# Patient Record
Sex: FEMALE | Race: BLACK OR AFRICAN AMERICAN | URBAN - METROPOLITAN AREA
[De-identification: names, ages, dates, MRNs, and addresses within clinical notes are randomized per-mention and may not be internally consistent; named-entity substitution may affect disease eponyms.]

---

## 2021-01-27 ENCOUNTER — TRANSFERRED RECORDS (OUTPATIENT)
Dept: PHYSICAL THERAPY | Facility: CLINIC | Age: 51
End: 2021-01-27

## 2021-03-04 ENCOUNTER — THERAPY VISIT (OUTPATIENT)
Dept: PHYSICAL THERAPY | Facility: CLINIC | Age: 51
End: 2021-03-04
Payer: MEDICAID

## 2021-03-04 DIAGNOSIS — M54.50 BILATERAL LOW BACK PAIN WITHOUT SCIATICA: Primary | ICD-10-CM

## 2021-03-04 PROCEDURE — 97161 PT EVAL LOW COMPLEX 20 MIN: CPT | Mod: GP | Performed by: PHYSICAL THERAPIST

## 2021-03-04 PROCEDURE — 97140 MANUAL THERAPY 1/> REGIONS: CPT | Mod: GP | Performed by: PHYSICAL THERAPIST

## 2021-03-04 NOTE — LETTER
DEPARTMENT OF HEALTH AND HUMAN SERVICES  [facility] FOR MEDICARE & MEDICAID SERVICES    PLAN/UPDATED PLAN OF PROGRESS FOR OUTPATIENT REHABILITATION       PATIENTS NAME:  Bouchra Rossi     : 1970    PROVIDER NUMBER:    [id number]    [id number]     PROVIDER NAME: [facility]    MEDICAL RECORD NUMBER: [id number]     START OF CARE DATE:  SOC Date: 21   TYPE:  PT    PRIMARY/TREATMENT DIAGNOSIS: (Pertinent Medical Diagnosis)  Bilateral low back pain without sciatica    VISITS FROM START OF CARE:  Rxs Used: 1     KEY PT FINDINGS:  1) Unexplained weight loss and associated sx - concern  2) Extension preference  3) Weak core evidenced by + Timoteo sign.    Physical Therapy Initial Evaluation: Subjective History     Injury/Condition Details:  Presenting Complaint LBP and L sided pain to anterior thigh   Onset Timing/Date 21 (date of MD order)  Years, worsening the last couple months   Mechanism Gradual      Symptom Behavior Details    Primary Symptoms pain (Location: Low back and L buttock/anterior thigh), currently 8/10   Symptom Provocators Sitting   Symptom Relievers Walking   Time of day dependent? No   Recent symptom change? symptoms worsening     Prior Testing/Intervention for current condition:  Prior Tests  none   Prior Treatment PT (shoulder) - not helpful     Lifestyle & General Medical History:  Employment None   Usual physical activities  (within past year) Playing with grand kids   Orthopaedic history See Epic Chart    Notable medical history See Epic Chart   Patient Reported Health good   Red flags: Patient reports having unexplained weight loss, loss of appetite, night sweats and trouble sleeping with onset same as low back pain.      PHYSICAL THERAPY SPINE EXAMINATION    Dynamic Movement Screen:  2 leg stance: equal weight bearing    Lumbar & Thoracic Spine ROM Screen   RIGHT LEFT   Standing Lumbar Spine ROM   Flexion To ankles, pain increased   Extension Limited, pain       Core: Positive Timoteo's sign observed indicated weak core strength.    Sacroiliac Joint Provocative Testing: pain with sacral compression supine    Tender to palpation at the following structures: Paraspinals bilaterally low back, bilateral PSIS     SUSPECTED DIRECTIONAL PREFERENCE: Extension    Hip PROM:  -Flexion: R WNL; L 70 deg, limited due to pain in low back   -ER: R WNL; L unable to test due to pain  -IR: R WNL; L unable to test due to pain    Lower Extremity Neural System Examination   Right Left   NEURAL TENSION     Supine SLR Test (Sciatic n.) - ++     Lower Extremity Lower Extremity Muscle Strength (x/5) - Deferred due to significant pain during PROM testing.     ASSESSMENT/PLAN:    Patient is a 50 year old female with lumbar complaints.    Patient has the following significant findings with corresponding treatment plan.                Diagnosis 1:  Low back with L sided buttock/anterior thigh pain  Pain -  hot/cold therapy, US, electric stimulation, manual therapy, splint/taping/bracing/orthotics, self management, education and home program  Decreased ROM/flexibility - manual therapy, therapeutic exercise and home program  Decreased joint mobility - manual therapy, therapeutic exercise and home program  Decreased strength - therapeutic exercise, therapeutic activities and home program  Impaired muscle performance - neuro re-education  Decreased function - therapeutic activities  Impaired posture - neuro re-education    Therapy Evaluation Codes:   1) History comprised of:   Personal factors that impact the plan of care:      None.    Comorbidity factors that impact the plan of care are:      None.     Medications impacting care: None.  2) Examination of Body Systems comprised of:   Body structures and functions that impact the plan of care:      Lumbar spine.   Activity limitations that impact the plan of care are:      Bending, Sitting, Sleeping and Laying down.  3) Clinical presentation  "characteristics are:   Evolving/Changing.  4) Decision-Making    Moderate complexity using standardized patient assessment instrument and/or measureable assessment of functional outcome.  Cumulative Therapy Evaluation is: Moderate complexity.    Previous and current functional limitations:  (See Goal Flow Sheet for this information)    Short term and Long term goals: (See Goal Flow Sheet for this information)     Communication ability:  Patient appears to be able to clearly communicate and understand verbal and written communication and follow directions correctly.  Treatment Explanation - The following has been discussed with the patient:   RX ordered/plan of care  Anticipated outcomes  Possible risks and side effects  This patient would benefit from PT intervention to resume normal activities.   Rehab potential is fair.    Frequency:  2-3 X a month, once daily  Duration:  for 3 months  Discharge Plan:  Achieve all LTG.  Independent in home treatment program.  Reach maximal therapeutic benefit.            Caregiver Signature/Credentials _____________________________ Date ________      Treating Provider: Caron Martinez, SERGIO, OCS      I have reviewed and certified the need for these services and plan of treatment while under my care.        PHYSICIAN'S SIGNATURE:   ______________________________________ Date___________                           Ge Lepe MD      Certification period:  Beginning of Cert date period: 03/04/21 to  End of Cert period date: 05/31/21     Functional Level Progress Report: Please see attached \"Goal Flow sheet for Functional level.\"    ____X____ Continue Services or       ________ DC Services                Service dates: From  SOC Date: 03/04/21 date to present                         "

## 2021-03-04 NOTE — PROGRESS NOTES
KEY PT FINDINGS:  1) Unexplained weight loss and associated sx - concern  2) Extension preference  3) Weak core evidenced by + Labolt sign.    Physical Therapy Initial Evaluation: Subjective History     Injury/Condition Details:  Presenting Complaint LBP and L sided pain to anterior thigh   Onset Timing/Date 2/9/21 (date of MD order)  Years, worsening the last couple months   Mechanism Gradual      Symptom Behavior Details    Primary Symptoms pain (Location: Low back and L buttock/anterior thigh), currently 8/10   Symptom Provocators Sitting   Symptom Relievers Walking   Time of day dependent? No   Recent symptom change? symptoms worsening     Prior Testing/Intervention for current condition:  Prior Tests  none   Prior Treatment PT (shoulder) - not helpful     Lifestyle & General Medical History:  Employment None   Usual physical activities  (within past year) Playing with grand kids   Orthopaedic history See Epic Chart    Notable medical history See Epic Chart   Patient Reported Health good     Red flags: Patient reports having unexplained weight loss, loss of appetite, night sweats and trouble sleeping with onset same as low back pain.      PHYSICAL THERAPY SPINE EXAMINATION    Dynamic Movement Screen:  2 leg stance: equal weight bearing    Lumbar & Thoracic Spine ROM Screen   RIGHT LEFT   Standing Lumbar Spine ROM   Flexion To ankles, pain increased   Extension Limited, pain      Core: Positive Labolt's sign observed indicated weak core strength.    Sacroiliac Joint Provocative Testing: pain with sacral compression supine    Tender to palpation at the following structures: Paraspinals bilaterally low back, bilateral PSIS     SUSPECTED DIRECTIONAL PREFERENCE: Extension    Hip PROM:  -Flexion: R WNL; L 70 deg, limited due to pain in low back   -ER: R WNL; L unable to test due to pain  -IR: R WNL; L unable to test due to pain    Lower Extremity Neural System Examination   Right Left   NEURAL TENSION     Supine SLR  Test (Sciatic n.) - ++     Lower Extremity Lower Extremity Muscle Strength (x/5) - Deferred due to significant pain during PROM testing.     ASSESSMENT/PLAN:    Patient is a 50 year old female with lumbar complaints.    Patient has the following significant findings with corresponding treatment plan.                Diagnosis 1:  Low back with L sided buttock/anterior thigh pain  Pain -  hot/cold therapy, US, electric stimulation, manual therapy, splint/taping/bracing/orthotics, self management, education and home program  Decreased ROM/flexibility - manual therapy, therapeutic exercise and home program  Decreased joint mobility - manual therapy, therapeutic exercise and home program  Decreased strength - therapeutic exercise, therapeutic activities and home program  Impaired muscle performance - neuro re-education  Decreased function - therapeutic activities  Impaired posture - neuro re-education    Therapy Evaluation Codes:   1) History comprised of:   Personal factors that impact the plan of care:      None.    Comorbidity factors that impact the plan of care are:      None.     Medications impacting care: None.  2) Examination of Body Systems comprised of:   Body structures and functions that impact the plan of care:      Lumbar spine.   Activity limitations that impact the plan of care are:      Bending, Sitting, Sleeping and Laying down.  3) Clinical presentation characteristics are:   Evolving/Changing.  4) Decision-Making    Moderate complexity using standardized patient assessment instrument and/or measureable assessment of functional outcome.  Cumulative Therapy Evaluation is: Moderate complexity.    Previous and current functional limitations:  (See Goal Flow Sheet for this information)    Short term and Long term goals: (See Goal Flow Sheet for this information)     Communication ability:  Patient appears to be able to clearly communicate and understand verbal and written communication and follow  directions correctly.  Treatment Explanation - The following has been discussed with the patient:   RX ordered/plan of care  Anticipated outcomes  Possible risks and side effects  This patient would benefit from PT intervention to resume normal activities.   Rehab potential is fair.    Frequency:  2 X a month, once daily  Duration:  for 3 months  Discharge Plan:  Achieve all LTG.  Independent in home treatment program.  Reach maximal therapeutic benefit.    Please refer to the daily flowsheet for treatment today, total treatment time and time spent performing 1:1 timed codes.

## 2021-03-04 NOTE — LETTER
DEPARTMENT OF HEALTH AND HUMAN SERVICES  [facility] & MEDICAID SERVICES    PLAN/UPDATED PLAN OF PROGRESS FOR OUTPATIENT REHABILITATION    PATIENTS NAME:  Bouchra Rossi   : 1970    PROVIDER NUMBER:    [id number]    [id number]  PROVIDER NAME: [facility]    MEDICAL RECORD NUMBER: [id number]     START OF CARE DATE:  SOC Date: 21   TYPE:  PT    PRIMARY/TREATMENT DIAGNOSIS: (Pertinent Medical Diagnosis)  Bilateral low back pain without sciatica    VISITS FROM START OF CARE:  Rxs Used: 1     KEY PT FINDINGS:  1) Unexplained weight loss and associated sx - concern  2) Extension preference  3) Weak core evidenced by + Timoteo sign.    Physical Therapy Initial Evaluation: Subjective History     Injury/Condition Details:  Presenting Complaint LBP and L sided pain to anterior thigh   Onset Timing/Date 21 (date of MD order)  Years, worsening the last couple months   Mechanism Gradual      Symptom Behavior Details    Primary Symptoms pain (Location: Low back and L buttock/anterior thigh), currently 8/10   Symptom Provocators Sitting   Symptom Relievers Walking   Time of day dependent? No   Recent symptom change? symptoms worsening     Prior Testing/Intervention for current condition:  Prior Tests  none   Prior Treatment PT (shoulder) - not helpful     Lifestyle & General Medical History:  Employment None   Usual physical activities  (within past year) Playing with grand kids   Orthopaedic history See Epic Chart    Notable medical history See Epic Chart   Patient Reported Health good     Red flags: Patient reports having unexplained weight loss, loss of appetite, night sweats and trouble sleeping with onset same as low back pain.    PHYSICAL THERAPY SPINE EXAMINATION    Dynamic Movement Screen:  2 leg stance: equal weight bearing    Lumbar & Thoracic Spine ROM Screen   RIGHT LEFT   Standing Lumbar Spine ROM   Flexion To ankles, pain increased   Extension  Limited, pain      Core: Positive Charlotte's sign observed indicated weak core strength.    Sacroiliac Joint Provocative Testing: pain with sacral compression supine    Tender to palpation at the following structures: Paraspinals bilaterally low back, bilateral PSIS     SUSPECTED DIRECTIONAL PREFERENCE: Extension    Hip PROM:  -Flexion: R WNL; L 70 deg, limited due to pain in low back   -ER: R WNL; L unable to test due to pain  -IR: R WNL; L unable to test due to pain    Lower Extremity Neural System Examination   Right Left   NEURAL TENSION     Supine SLR Test (Sciatic n.) - ++     Lower Extremity Lower Extremity Muscle Strength (x/5) - Deferred due to significant pain during PROM testing.     ASSESSMENT/PLAN:    Patient is a 50 year old female with lumbar complaints.    Patient has the following significant findings with corresponding treatment plan.                Diagnosis 1:  Low back with L sided buttock/anterior thigh pain  Pain -  hot/cold therapy, US, electric stimulation, manual therapy, splint/taping/bracing/orthotics, self management, education and home program  Decreased ROM/flexibility - manual therapy, therapeutic exercise and home program  Decreased joint mobility - manual therapy, therapeutic exercise and home program  Decreased strength - therapeutic exercise, therapeutic activities and home program  Impaired muscle performance - neuro re-education  Decreased function - therapeutic activities  Impaired posture - neuro re-education      PATIENTS NAME:  Bouchra Rossi   : 1970    Therapy Evaluation Codes:   1) History comprised of:   Personal factors that impact the plan of care:      None.    Comorbidity factors that impact the plan of care are:      None.     Medications impacting care: None.  2) Examination of Body Systems comprised of:   Body structures and functions that impact the plan of care:      Lumbar spine.   Activity limitations that impact the plan of care are:      Bending,  "Sitting, Sleeping and Laying down.  3) Clinical presentation characteristics are:   Evolving/Changing.  4) Decision-Making    Moderate complexity using standardized patient assessment instrument and/or measureable assessment of functional outcome.  Cumulative Therapy Evaluation is: Moderate complexity.    Previous and current functional limitations:  (See Goal Flow Sheet for this information)    Short term and Long term goals: (See Goal Flow Sheet for this information)     Communication ability:  Patient appears to be able to clearly communicate and understand verbal and written communication and follow directions correctly.  Treatment Explanation - The following has been discussed with the patient:   RX ordered/plan of care  Anticipated outcomes  Possible risks and side effects  This patient would benefit from PT intervention to resume normal activities.   Rehab potential is fair.    Frequency:  2 X a month, once daily  Duration:  for 3 months  Discharge Plan:  Achieve all LTG.  Independent in home treatment program.  Reach maximal therapeutic benefit.    Please refer to the daily flowsheet for treatment today, total treatment time and time spent performing 1:1 timed codes.                           PATIENTS NAME:  Bouchra Rossi   : 1970    Caregiver Signature/Credentials _____________________________ Date ________       Treating Provider: KAMLESH DaveT, OCS      and Maria C Rausch SPT   I have reviewed and certified the need for these services and plan of treatment while under my care.        PHYSICIAN'S SIGNATURE:   _________________________________________  Date___________   Ge Lepe MD    Certification period:  Beginning of Cert date period: 21 to  End of Cert period date: 21     Functional Level Progress Report: Please see attached \"Goal Flow sheet for Functional level.\"    ____X____ Continue Services or       ________ DC Services                Service dates: From  " SOC Date: 03/04/21 date to present